# Patient Record
Sex: FEMALE | Race: OTHER | NOT HISPANIC OR LATINO
[De-identification: names, ages, dates, MRNs, and addresses within clinical notes are randomized per-mention and may not be internally consistent; named-entity substitution may affect disease eponyms.]

---

## 2018-10-15 ENCOUNTER — APPOINTMENT (OUTPATIENT)
Dept: ORTHOPEDIC SURGERY | Facility: CLINIC | Age: 43
End: 2018-10-15
Payer: COMMERCIAL

## 2018-10-15 VITALS
HEIGHT: 59 IN | DIASTOLIC BLOOD PRESSURE: 69 MMHG | SYSTOLIC BLOOD PRESSURE: 112 MMHG | HEART RATE: 70 BPM | BODY MASS INDEX: 30.64 KG/M2 | WEIGHT: 152 LBS

## 2018-10-15 DIAGNOSIS — Z87.09 PERSONAL HISTORY OF OTHER DISEASES OF THE RESPIRATORY SYSTEM: ICD-10-CM

## 2018-10-15 DIAGNOSIS — M77.12 LATERAL EPICONDYLITIS, LEFT ELBOW: ICD-10-CM

## 2018-10-15 DIAGNOSIS — Z78.9 OTHER SPECIFIED HEALTH STATUS: ICD-10-CM

## 2018-10-15 DIAGNOSIS — Z82.61 FAMILY HISTORY OF ARTHRITIS: ICD-10-CM

## 2018-10-15 PROCEDURE — 73080 X-RAY EXAM OF ELBOW: CPT | Mod: LT

## 2018-10-15 PROCEDURE — 99204 OFFICE O/P NEW MOD 45 MIN: CPT | Mod: 25

## 2018-10-15 PROCEDURE — 20605 DRAIN/INJ JOINT/BURSA W/O US: CPT | Mod: LT

## 2018-10-15 RX ORDER — CREAM BASE NO.31
CREAM (GRAM) MISCELLANEOUS
Qty: 100 | Refills: 1 | Status: ACTIVE | COMMUNITY
Start: 2018-10-15 | End: 1900-01-01

## 2018-10-15 RX ORDER — DIPHENHYDRAMINE HCL, IBUPROFEN 25; 200 MG/1; MG/1
CAPSULE, LIQUID FILLED ORAL
Refills: 0 | Status: ACTIVE | COMMUNITY

## 2018-10-15 RX ORDER — UBIDECARENONE/VIT E ACET 100MG-5
50 MCG CAPSULE ORAL
Refills: 0 | Status: ACTIVE | COMMUNITY

## 2018-11-15 ENCOUNTER — APPOINTMENT (OUTPATIENT)
Dept: ORTHOPEDIC SURGERY | Facility: CLINIC | Age: 43
End: 2018-11-15
Payer: COMMERCIAL

## 2018-11-15 VITALS
HEIGHT: 59 IN | WEIGHT: 152 LBS | DIASTOLIC BLOOD PRESSURE: 71 MMHG | HEART RATE: 74 BPM | SYSTOLIC BLOOD PRESSURE: 114 MMHG | BODY MASS INDEX: 30.64 KG/M2

## 2018-11-15 PROCEDURE — 99213 OFFICE O/P EST LOW 20 MIN: CPT

## 2018-11-15 RX ORDER — MELOXICAM 7.5 MG/1
7.5 TABLET ORAL
Qty: 30 | Refills: 1 | Status: ACTIVE | COMMUNITY
Start: 2018-11-15 | End: 1900-01-01

## 2019-01-14 ENCOUNTER — APPOINTMENT (OUTPATIENT)
Dept: ORTHOPEDIC SURGERY | Facility: CLINIC | Age: 44
End: 2019-01-14
Payer: COMMERCIAL

## 2019-01-14 VITALS
BODY MASS INDEX: 30.64 KG/M2 | HEIGHT: 59 IN | HEART RATE: 80 BPM | SYSTOLIC BLOOD PRESSURE: 129 MMHG | WEIGHT: 152 LBS | DIASTOLIC BLOOD PRESSURE: 81 MMHG

## 2019-01-14 DIAGNOSIS — M77.01 MEDIAL EPICONDYLITIS, RIGHT ELBOW: ICD-10-CM

## 2019-01-14 PROCEDURE — 99213 OFFICE O/P EST LOW 20 MIN: CPT

## 2019-01-14 RX ORDER — DICLOFENAC SODIUM 10 MG/G
1 GEL TOPICAL DAILY
Qty: 1 | Refills: 1 | Status: ACTIVE | COMMUNITY
Start: 2019-01-14 | End: 1900-01-01

## 2019-01-14 RX ORDER — MELOXICAM 7.5 MG/1
7.5 TABLET ORAL
Qty: 30 | Refills: 1 | Status: ACTIVE | COMMUNITY
Start: 2019-01-14 | End: 1900-01-01

## 2019-01-16 ENCOUNTER — OTHER (OUTPATIENT)
Age: 44
End: 2019-01-16

## 2019-01-19 NOTE — REVIEW OF SYSTEMS
[Joint Pain] : joint pain [Discharge] : no discharge [Cough] : no cough [FreeTextEntry9] : Right elbow

## 2019-01-19 NOTE — HISTORY OF PRESENT ILLNESS
[de-identified] : Ms. AVINA is a 43 year old female who is being seen for Follow-up visit of Right elbow pain. Patient had lateral epicondylitis injection Oct 15th and felt well until right before the holidays.  Now has medial = lateral epicondyle pain.  Took meloxicam every night.  [place] better but woke up with pain.  Insurance did not cover transdermal cream.  Lidocaine works ok.  Was taking 7.5 meloxicam.

## 2019-01-19 NOTE — PHYSICAL EXAM
[de-identified] : Patient is well appearing, in non acute distress with nonlabored breathing and appropriate mood and affect. Extra-ocular movements intact and no nasal discharge.\par \par Right UE warm and well perfused; palpable radial pulse\par SILT C5-T1\par motor intact to finger flexion, wrist extension and flexion, elbow flexion and extension, supination and pronation, deltoid\par Tenderness to palpation at the lateral more than the medial epicondyle\par Pain that lateral epicondyles with resisted wrist extension and finger extension and supination\par No pain at the medial side of the elbow with resisted wrist flexion and pronation\par \par Left UE warm and well perfused; palpable radial pulse\par SILT C5-T1\par motor intact to finger flexion, wrist extension and flexion, elbow flexion and extension, supination and pronation, deltoid\par

## 2019-01-19 NOTE — ASSESSMENT
[FreeTextEntry1] : Patient is a 43-year-old female who follows up for medial and lateral right elbow epicondylitis.Discussed findings and diagnosis and the risks, benefits, and alternatives of all treatment options. Patient has opted to try diclofenac transdermal gel and meloxicam, however, she knows not to take him together. Risks discussed.  Have ordered PT.  Recommended use of counterforce brace and demonstrated use; recommended to use only when doing activities that bother her elbow.  May continue lidocaine otc.  Recommend to discontinue kickboxing. Patient will followup p.r.n. If pain persists or worsens she will consider corticosteroid injection.  F/u prn.

## 2019-01-24 ENCOUNTER — OTHER (OUTPATIENT)
Age: 44
End: 2019-01-24

## 2019-01-24 RX ORDER — CREAM BASE NO.31
CREAM (GRAM) MISCELLANEOUS
Qty: 100 | Refills: 1 | Status: ACTIVE | COMMUNITY
Start: 2019-01-24 | End: 1900-01-01

## 2019-01-26 ENCOUNTER — OUTPATIENT (OUTPATIENT)
Dept: OUTPATIENT SERVICES | Facility: HOSPITAL | Age: 44
LOS: 1 days | End: 2019-01-26
Payer: COMMERCIAL

## 2019-01-26 DIAGNOSIS — M77.11 LATERAL EPICONDYLITIS, RIGHT ELBOW: ICD-10-CM

## 2019-01-26 DIAGNOSIS — Z51.89 ENCOUNTER FOR OTHER SPECIFIED AFTERCARE: ICD-10-CM

## 2019-06-12 ENCOUNTER — APPOINTMENT (OUTPATIENT)
Dept: ORTHOPEDIC SURGERY | Facility: CLINIC | Age: 44
End: 2019-06-12
Payer: COMMERCIAL

## 2019-06-12 VITALS
HEIGHT: 59 IN | HEART RATE: 67 BPM | WEIGHT: 152 LBS | SYSTOLIC BLOOD PRESSURE: 109 MMHG | DIASTOLIC BLOOD PRESSURE: 68 MMHG | BODY MASS INDEX: 30.64 KG/M2

## 2019-06-12 DIAGNOSIS — Z87.39 PERSONAL HISTORY OF OTHER DISEASES OF THE MUSCULOSKELETAL SYSTEM AND CONNECTIVE TISSUE: ICD-10-CM

## 2019-06-12 PROCEDURE — 99213 OFFICE O/P EST LOW 20 MIN: CPT

## 2019-06-12 RX ORDER — MELOXICAM 15 MG/1
15 TABLET ORAL
Qty: 30 | Refills: 0 | Status: ACTIVE | COMMUNITY
Start: 2019-06-12 | End: 1900-01-01

## 2019-06-19 PROCEDURE — 97140 MANUAL THERAPY 1/> REGIONS: CPT

## 2019-06-19 PROCEDURE — 97010 HOT OR COLD PACKS THERAPY: CPT

## 2019-06-19 PROCEDURE — 97110 THERAPEUTIC EXERCISES: CPT

## 2019-06-19 PROCEDURE — 97161 PT EVAL LOW COMPLEX 20 MIN: CPT

## 2023-12-20 ENCOUNTER — APPOINTMENT (OUTPATIENT)
Dept: ORTHOPEDIC SURGERY | Facility: CLINIC | Age: 48
End: 2023-12-20
Payer: COMMERCIAL

## 2023-12-20 VITALS
BODY MASS INDEX: 33.06 KG/M2 | HEIGHT: 59 IN | SYSTOLIC BLOOD PRESSURE: 109 MMHG | DIASTOLIC BLOOD PRESSURE: 75 MMHG | WEIGHT: 164 LBS | HEART RATE: 76 BPM

## 2023-12-20 PROCEDURE — 99203 OFFICE O/P NEW LOW 30 MIN: CPT | Mod: 25

## 2023-12-20 PROCEDURE — 20610 DRAIN/INJ JOINT/BURSA W/O US: CPT | Mod: LT

## 2023-12-20 NOTE — HISTORY OF PRESENT ILLNESS
[de-identified] : 12/20/2023 : RONY AVINA  is a 48 year  old female who presents to the office for evaluation of her left shoulder.  She states for couple months now without any acute traumatic injury she has had pain in the left shoulder.  She states the pain can be sharp and is worse certain activities and motions and alleviated with rest.  She states that she had an MRI and x-rays completed that showed calcific tendinitis and she is here to discuss results with a specialist.  She states she went to 6 weeks of physical therapy that did not give any significant results.  She has taken some anti-inflammatory medications which only gave mild relief as well.  She states it is worse with sleeping and certain reaching exercises and alleviated with rest.  She denies any numbness or tingling distally.  She has no other complaints today.

## 2023-12-20 NOTE — PROCEDURE
[de-identified] : I injected the patient's right shoulder today with cortisone.   I discussed at length with the patient the planned steroid and lidocaine injection. The risks, benefits, convalescence and alternatives were reviewed. The possible side effects discussed included but were not limited to: pain, swelling, heat, bleeding, and redness. Symptoms are generally mild but if they are extensive then contact the office. Giving pain relievers by mouth such as NSAIDs or Tylenol can generally treat the reactions to steroid and lidocaine. Rare cases of infection have been noted. Rash, hives and itching may occur post injection. If you have muscle pain or cramps, flushing and or swelling of the face, rapid heart beat, nausea, dizziness, fever, chills, headache, difficulty breathing, swelling in the arms or legs, or have a prickly feeling of your skin, contact a health care provider immediately. Following this discussion, the shoulder was prepped with Chlorhexidine and Alcohol and under sterile conditions the 80 mg Depo-Medrol and 4 cc Lidocaine injection was performed with a 22 gauge needle through a posterolateral injection site. The needle was introduced into the subacromial space and the medication was injected. Upon withdrawal of the needle the site was cleaned with alcohol and a band aid was applied. The patient tolerated the injection well and there were no adverse effects. Post injection instructions included no strenuous activity for 24 hours, cryotherapy and if there are any adverse effects to contact the office.

## 2023-12-20 NOTE — PHYSICAL EXAM
[de-identified] : General: Awake, alert, no acute distress, Patient was cooperative and appropriate during the examination.  The patient is of normal weight for height and age.  Walks without an antalgic gait.  Full, painless range of motion of the neck and back.  Exam of the bilateral lower extremities is intact and symmetric with regards to dermatologic, vascular, and neurologic exam. Bilateral lower extremity sensation is grossly intact to light touch in the DP/SP/T/S/S nerve distributions. Intact DF/PF/EHL. BIlateral lower extremities warm and well-perfused with brisk capillary refill.   Pulmonary: Regular, nonlabored breathing  Abdomen: Soft, nontender, nondistended.  Lymphatic: No evidence of axillary lymphadenopathy  Left shoulder Exam: Physical exam of the shoulder demonstrates normal skin without signs of skin changes or abnormalities. No erythema, warmth, or joint effusion appreciated.     Sensation intact light touch C5-T1 Palpable radial pulse Radial/ulnar/median/axillary/musculocutaneous/AIN/PIN nerves grossly intact   Range of motion: Forward Flexion: 170 Abduction: 130 External Rotation: 45 Internal Rotation: Mid lumbar level   Palpation: Not tender to palpation over the glenohumeral joint Moderately tender palpation over the rotator cuff insertion on the greater tuberosity Not tender to palpation over the AC joint Not tender to palpation of the biceps tendon/bicipital groove Moderately tender over the trapezial and parascapular us on the left side   Strength testing: Supraspinatus: 5/5 Infraspinatus: 5/5 Teres minor: 5/5 Subscapularis: 5/5   Special test: Empty can test negative  Abreu impingement test negative Speeds test negative [place]'s test negative Lift-off test negative Bell-press test negative Cross-arm adduction test negative   [de-identified] : MRI of the left shoulder taken at  radiology showed calcific tendinitis and shallow upper border tearing of the subscapularis and no other acute findings.  X-rays 4 views of the left shoulder taken the office today on 12/20/2023 shows evidence of calcific tendinitis and no other acute findings.

## 2023-12-20 NOTE — DISCUSSION/SUMMARY
[de-identified] : Assessment: Patient is a 48-year-old female with left shoulder calcific tendinitis.  Plan: I had a long discussion with the patient today regarding the nature of their diagnosis and treatment plan. We discussed the risks and benefits of no treatment as well as nonoperative and operative treatments.  I reviewed the x-ray of the shoulder that showed no acute fracture or dislocation with evidence of calcific tendinitis.  I reviewed the MRI that showed partial-thickness tearing of the subscapularis and evidence of calcific tendinitis.  At this time I am recommending continue conservative treatment clued ice, heat, rest, over-the-counter anti-inflammatories and physical therapy both formally and on her own for symptomatic relief.  She was given new prescriptions today and GI precautions were discussed.  I am also recommending a left shoulder subacromial injection be administered in the office today.  She tolerated the procedure well with no adverse effects.  She will follow-up in 2 months for repeat evaluation to reassess as needed.  We did discuss percutaneous cutaneous lavage versus surgical intervention in the future pending reevaluation and pending her results of this injection.  Patient seen and examined with Dr. Negron today.   The patient verbalizes their understanding and agrees with the plan.  All questions were answered to their satisfaction.

## 2024-02-26 ENCOUNTER — APPOINTMENT (OUTPATIENT)
Dept: ORTHOPEDIC SURGERY | Facility: CLINIC | Age: 49
End: 2024-02-26

## 2024-02-26 NOTE — DISCUSSION/SUMMARY
[de-identified] : Assessment: 48-year-old female with left shoulder calcific tendinitis.  Plan: I had a long discussion with the patient today regarding the nature of their diagnosis and treatment plan. We discussed the risks and benefits of no treatment as well as nonoperative and operative treatments.  I reviewed the x-ray of the shoulder that showed no acute fracture or dislocation with evidence of calcific tendinitis.  I reviewed the MRI that showed partial-thickness tearing of the subscapularis and evidence of calcific tendinitis.  [ ].   The patient verbalizes their understanding and agrees with the plan.  All questions were answered to their satisfaction.

## 2024-02-26 NOTE — HISTORY OF PRESENT ILLNESS
[de-identified] : 2/25/2024: Ariella is a 48-year-old female returns to the office today for reevaluation of her left shoulder pain.  The patient states that since her last appointment her symptoms are [ ].  She denies any fevers, chills, sweats, or pain elsewhere.  12/20/2023 : ARIELLA AVINA  is a 48 year  old female who presents to the office for evaluation of her left shoulder.  She states for couple months now without any acute traumatic injury she has had pain in the left shoulder.  She states the pain can be sharp and is worse certain activities and motions and alleviated with rest.  She states that she had an MRI and x-rays completed that showed calcific tendinitis and she is here to discuss results with a specialist.  She states she went to 6 weeks of physical therapy that did not give any significant results.  She has taken some anti-inflammatory medications which only gave mild relief as well.  She states it is worse with sleeping and certain reaching exercises and alleviated with rest.  She denies any numbness or tingling distally.  She has no other complaints today.

## 2024-02-26 NOTE — PHYSICAL EXAM
[de-identified] : General: Awake, alert, no acute distress, Patient was cooperative and appropriate during the examination.  The patient is of normal weight for height and age.  Walks without an antalgic gait.  Full, painless range of motion of the neck and back.  Exam of the bilateral lower extremities is intact and symmetric with regards to dermatologic, vascular, and neurologic exam. Bilateral lower extremity sensation is grossly intact to light touch in the DP/SP/T/S/S nerve distributions. Intact DF/PF/EHL. BIlateral lower extremities warm and well-perfused with brisk capillary refill.   Pulmonary: Regular, nonlabored breathing  Abdomen: Soft, nontender, nondistended.  Lymphatic: No evidence of axillary lymphadenopathy  Left shoulder Exam: Physical exam of the shoulder demonstrates normal skin without signs of skin changes or abnormalities. No erythema, warmth, or joint effusion appreciated.     Sensation intact light touch C5-T1 Palpable radial pulse Radial/ulnar/median/axillary/musculocutaneous/AIN/PIN nerves grossly intact   Range of motion: Forward Flexion: 170 Abduction: 130 External Rotation: 45 Internal Rotation: Mid lumbar level   Palpation: Not tender to palpation over the glenohumeral joint Moderately tender palpation over the rotator cuff insertion on the greater tuberosity Not tender to palpation over the AC joint Not tender to palpation of the biceps tendon/bicipital groove Moderately tender over the trapezial and parascapular us on the left side   Strength testing: Supraspinatus: 5/5 Infraspinatus: 5/5 Teres minor: 5/5 Subscapularis: 5/5   Special test: Empty can test negative  Abreu impingement test negative Speeds test negative [place]'s test negative Lift-off test negative Bell-press test negative Cross-arm adduction test negative   [de-identified] : MRI of the left shoulder taken at  radiology showed calcific tendinitis and shallow upper border tearing of the subscapularis and no other acute findings.  X-rays 4 views of the left shoulder taken the office today on 12/20/2023 shows evidence of calcific tendinitis and no other acute findings.

## 2024-04-12 ENCOUNTER — APPOINTMENT (OUTPATIENT)
Dept: ORTHOPEDIC SURGERY | Facility: CLINIC | Age: 49
End: 2024-04-12

## 2024-04-18 ENCOUNTER — APPOINTMENT (OUTPATIENT)
Dept: ORTHOPEDIC SURGERY | Facility: CLINIC | Age: 49
End: 2024-04-18
Payer: COMMERCIAL

## 2024-04-18 DIAGNOSIS — M75.32 CALCIFIC TENDINITIS OF LEFT SHOULDER: ICD-10-CM

## 2024-04-18 PROCEDURE — 20610 DRAIN/INJ JOINT/BURSA W/O US: CPT | Mod: LT

## 2024-04-18 PROCEDURE — 99213 OFFICE O/P EST LOW 20 MIN: CPT | Mod: 25

## 2024-04-18 NOTE — HISTORY OF PRESENT ILLNESS
[de-identified] : 04/18/2024 : RONY AVINA  is a 48 year  old female who presents to the office for follow-up evaluation of the left shoulder.  Since the last office visit she did very well after the injection in December however over the last few weeks the pain has gotten a little bit worse again.  She states is the same as it was last time but just not as severe yet.  She is here for repeat evaluation to discuss options.  She denies any numbness or tingling distally.  She has no other complaints today.  She denies any new injury.    12/20/2023 : RONY AVINA  is a 48 year  old female who presents to the office for evaluation of her left shoulder.  She states for couple months now without any acute traumatic injury she has had pain in the left shoulder.  She states the pain can be sharp and is worse certain activities and motions and alleviated with rest.  She states that she had an MRI and x-rays completed that showed calcific tendinitis and she is here to discuss results with a specialist.  She states she went to 6 weeks of physical therapy that did not give any significant results.  She has taken some anti-inflammatory medications which only gave mild relief as well.  She states it is worse with sleeping and certain reaching exercises and alleviated with rest.  She denies any numbness or tingling distally.  She has no other complaints today.

## 2024-04-18 NOTE — PHYSICAL EXAM
[de-identified] : General: Awake, alert, no acute distress, Patient was cooperative and appropriate during the examination.  The patient is of normal weight for height and age.  Walks without an antalgic gait.   Left shoulder Exam: Physical exam of the shoulder demonstrates normal skin without signs of skin changes or abnormalities. No erythema, warmth, or joint effusion appreciated.     Sensation intact light touch C5-T1 Palpable radial pulse Radial/ulnar/median/axillary/musculocutaneous/AIN/PIN nerves grossly intact   Range of motion: Forward Flexion: 170 Abduction: 130 External Rotation: 45 Internal Rotation: Mid lumbar level   Palpation: Not tender to palpation over the glenohumeral joint Moderately tender palpation over the rotator cuff insertion on the greater tuberosity Not tender to palpation over the AC joint Not tender to palpation of the biceps tendon/bicipital groove Moderately tender over the trapezial and parascapular us on the left side   Strength testing: Supraspinatus: 5/5 Infraspinatus: 5/5 Subscapularis: 5/5   Special test: Empty can test negative  Abreu impingement test negative Speeds test negative [place]'s test negative Lift-off test negative Bell-press test negative Cross-arm adduction test negative   [de-identified] : X-rays 2 views of the left shoulder taken the office today on 4/18/2024 shows calcific tendinitis and no other acute findings.  MRI of the left shoulder taken at  radiology showed calcific tendinitis and shallow upper border tearing of the subscapularis and no other acute findings.  X-rays 4 views of the left shoulder taken the office today on 12/20/2023 shows evidence of calcific tendinitis and no other acute findings.

## 2024-04-18 NOTE — PROCEDURE
[de-identified] : I injected the patient's left shoulder today with cortisone.   I discussed at length with the patient the planned steroid and lidocaine injection. The risks, benefits, convalescence and alternatives were reviewed. The possible side effects discussed included but were not limited to: pain, swelling, heat, bleeding, and redness. Symptoms are generally mild but if they are extensive then contact the office. Giving pain relievers by mouth such as NSAIDs or Tylenol can generally treat the reactions to steroid and lidocaine. Rare cases of infection have been noted. Rash, hives and itching may occur post injection. If you have muscle pain or cramps, flushing and or swelling of the face, rapid heart beat, nausea, dizziness, fever, chills, headache, difficulty breathing, swelling in the arms or legs, or have a prickly feeling of your skin, contact a health care provider immediately. Following this discussion, the shoulder was prepped with Chlorhexidine and Alcohol and under sterile conditions the 80 mg Depo-Medrol and 4 cc Lidocaine injection was performed with a 22 gauge needle through a posterolateral injection site. The needle was introduced into the subacromial space and the medication was injected. Upon withdrawal of the needle the site was cleaned with alcohol and a band aid was applied. The patient tolerated the injection well and there were no adverse effects. Post injection instructions included no strenuous activity for 24 hours, cryotherapy and if there are any adverse effects to contact the office.

## 2024-11-08 ENCOUNTER — APPOINTMENT (OUTPATIENT)
Dept: ORTHOPEDIC SURGERY | Facility: CLINIC | Age: 49
End: 2024-11-08
Payer: COMMERCIAL

## 2024-11-08 VITALS
HEART RATE: 68 BPM | SYSTOLIC BLOOD PRESSURE: 116 MMHG | WEIGHT: 164 LBS | HEIGHT: 59 IN | BODY MASS INDEX: 33.06 KG/M2 | DIASTOLIC BLOOD PRESSURE: 75 MMHG

## 2024-11-08 DIAGNOSIS — M75.32 CALCIFIC TENDINITIS OF LEFT SHOULDER: ICD-10-CM

## 2024-11-08 PROCEDURE — 20610 DRAIN/INJ JOINT/BURSA W/O US: CPT | Mod: LT

## 2024-11-08 PROCEDURE — 99214 OFFICE O/P EST MOD 30 MIN: CPT | Mod: 25

## 2025-01-10 ENCOUNTER — APPOINTMENT (OUTPATIENT)
Dept: ORTHOPEDIC SURGERY | Facility: CLINIC | Age: 50
End: 2025-01-10
Payer: COMMERCIAL

## 2025-01-10 VITALS — WEIGHT: 164 LBS | HEIGHT: 59 IN | BODY MASS INDEX: 33.06 KG/M2 | HEART RATE: 66 BPM

## 2025-01-10 DIAGNOSIS — M75.32 CALCIFIC TENDINITIS OF LEFT SHOULDER: ICD-10-CM

## 2025-01-10 PROCEDURE — 99213 OFFICE O/P EST LOW 20 MIN: CPT

## 2025-01-10 RX ORDER — NAPROXEN 500 MG/1
500 TABLET ORAL
Qty: 28 | Refills: 0 | Status: ACTIVE | COMMUNITY
Start: 2025-01-10 | End: 1900-01-01

## 2025-02-01 ENCOUNTER — APPOINTMENT (OUTPATIENT)
Dept: MRI IMAGING | Facility: CLINIC | Age: 50
End: 2025-02-01

## 2025-02-01 ENCOUNTER — OUTPATIENT (OUTPATIENT)
Dept: OUTPATIENT SERVICES | Facility: HOSPITAL | Age: 50
LOS: 1 days | End: 2025-02-01

## 2025-02-01 DIAGNOSIS — M75.32 CALCIFIC TENDINITIS OF LEFT SHOULDER: ICD-10-CM

## 2025-02-01 PROCEDURE — 73221 MRI JOINT UPR EXTREM W/O DYE: CPT | Mod: 26,LT

## 2025-02-14 ENCOUNTER — APPOINTMENT (OUTPATIENT)
Dept: ORTHOPEDIC SURGERY | Facility: CLINIC | Age: 50
End: 2025-02-14
Payer: COMMERCIAL

## 2025-02-14 VITALS
HEART RATE: 84 BPM | WEIGHT: 164 LBS | BODY MASS INDEX: 33.06 KG/M2 | DIASTOLIC BLOOD PRESSURE: 82 MMHG | SYSTOLIC BLOOD PRESSURE: 125 MMHG | HEIGHT: 59 IN

## 2025-02-14 DIAGNOSIS — M75.32 CALCIFIC TENDINITIS OF LEFT SHOULDER: ICD-10-CM

## 2025-02-14 PROCEDURE — 99213 OFFICE O/P EST LOW 20 MIN: CPT

## 2025-02-26 ENCOUNTER — RX RENEWAL (OUTPATIENT)
Age: 50
End: 2025-02-26

## 2025-03-28 ENCOUNTER — APPOINTMENT (OUTPATIENT)
Dept: ULTRASOUND IMAGING | Facility: CLINIC | Age: 50
End: 2025-03-28
Payer: COMMERCIAL

## 2025-03-28 PROCEDURE — 20611 DRAIN/INJ JOINT/BURSA W/US: CPT | Mod: LT
